# Patient Record
Sex: MALE | Race: WHITE
[De-identification: names, ages, dates, MRNs, and addresses within clinical notes are randomized per-mention and may not be internally consistent; named-entity substitution may affect disease eponyms.]

---

## 2022-02-22 ENCOUNTER — DASHBOARD ENCOUNTERS (OUTPATIENT)
Age: 62
End: 2022-02-22

## 2022-02-22 ENCOUNTER — CLAIMS CREATED FROM THE CLAIM WINDOW (OUTPATIENT)
Dept: URBAN - METROPOLITAN AREA CLINIC 40 | Facility: CLINIC | Age: 62
End: 2022-02-22
Payer: COMMERCIAL

## 2022-02-22 ENCOUNTER — WEB ENCOUNTER (OUTPATIENT)
Dept: URBAN - METROPOLITAN AREA CLINIC 40 | Facility: CLINIC | Age: 62
End: 2022-02-22

## 2022-02-22 VITALS
BODY MASS INDEX: 26.2 KG/M2 | WEIGHT: 183 LBS | HEIGHT: 70 IN | TEMPERATURE: 98.1 F | DIASTOLIC BLOOD PRESSURE: 80 MMHG | HEART RATE: 75 BPM | SYSTOLIC BLOOD PRESSURE: 120 MMHG

## 2022-02-22 DIAGNOSIS — Z92.29 HX OF LONG TERM USE OF BLOOD THINNERS: ICD-10-CM

## 2022-02-22 DIAGNOSIS — Z86.010 PERSONAL HISTORY OF COLON POLYPS: ICD-10-CM

## 2022-02-22 PROCEDURE — 99202 OFFICE O/P NEW SF 15 MIN: CPT | Performed by: PHYSICIAN ASSISTANT

## 2022-02-22 RX ORDER — OMEPRAZOLE 40 MG/1
TAKE 1 CAPSULE (40 MG) BY ORAL ROUTE ONCE DAILY BEFORE A MEAL FOR 30 DAYS CAPSULE, DELAYED RELEASE PELLETS ORAL 1
Qty: 30 | Refills: 5 | Status: DISCONTINUED | COMMUNITY
Start: 2017-05-24

## 2022-02-22 NOTE — HPI-TODAY'S VISIT:
Mr. Oconnell is a 61-year-old male with history of colon polyps who presents to the office today to schedule colonoscopy.  Last seen by Dr. Maxwell in 2017 when he did have a small, 2 mm sigmoid colon polyp removed.  Remaining exam was normal.  Per pathology, the polyp consistent with a hyperplastic lesion.  He also had an EGD where he was noted to have mild, benign gastritis.  No H. pylori.  Esophageal reflux, no Orlando's metaplasia noted. On omeprazole daily. Diabetic. History of cholecystectomy. Presents to the office today to schedule surveillance colonoscopy.  He has no complaints.  Denies constipation or diarrhea.  No bleeding.  Eats a balanced diet, high in fiber.  Drinks a lot of water.  Does protein shakes and coffee also during the day and remains very active working out.  He was diagnosed with non-Hodgkin's lymphoma in 2019 and followed by Dr. Lauri Cardenas.  At that time, he was also found to have a pulmonary embolus and was started on Xarelto.  He did undergo 6 months overall of chemotherapy and one month of radiation.  In remission, per patient.  Denies any nausea vomiting or dysphagia.  General seen hours a day.  He is not overweight.  Does not drink alcohol does not use any recreational drugs.  Non-smoker.  He has no family history of colorectal cancer.

## 2022-02-25 ENCOUNTER — TELEPHONE ENCOUNTER (OUTPATIENT)
Dept: URBAN - METROPOLITAN AREA CLINIC 40 | Facility: CLINIC | Age: 62
End: 2022-02-25

## 2023-02-02 ENCOUNTER — OFFICE VISIT (OUTPATIENT)
Dept: URBAN - METROPOLITAN AREA SURGERY CENTER 30 | Facility: SURGERY CENTER | Age: 63
End: 2023-02-02
Payer: COMMERCIAL

## 2023-02-02 DIAGNOSIS — Z86.010 ADENOMAS PERSONAL HISTORY OF COLONIC POLYPS: ICD-10-CM

## 2023-02-02 PROCEDURE — G0105 COLORECTAL SCRN; HI RISK IND: HCPCS | Performed by: INTERNAL MEDICINE

## 2023-02-02 PROCEDURE — G8907 PT DOC NO EVENTS ON DISCHARG: HCPCS | Performed by: INTERNAL MEDICINE
